# Patient Record
Sex: FEMALE | Employment: UNEMPLOYED | URBAN - NONMETROPOLITAN AREA
[De-identification: names, ages, dates, MRNs, and addresses within clinical notes are randomized per-mention and may not be internally consistent; named-entity substitution may affect disease eponyms.]

---

## 2022-09-16 RX ORDER — AMLODIPINE BESYLATE 5 MG/1
5 TABLET ORAL DAILY
COMMUNITY

## 2022-09-16 RX ORDER — ALBUTEROL SULFATE 90 UG/1
2 AEROSOL, METERED RESPIRATORY (INHALATION) EVERY 4 HOURS PRN
COMMUNITY

## 2022-09-16 RX ORDER — ATENOLOL 25 MG/1
25 TABLET ORAL DAILY
COMMUNITY

## 2022-09-16 RX ORDER — POTASSIUM CHLORIDE 750 MG/1
10 CAPSULE, EXTENDED RELEASE ORAL DAILY
COMMUNITY

## 2022-09-16 RX ORDER — WARFARIN SODIUM 5 MG/1
5 TABLET ORAL DAILY
COMMUNITY

## 2022-09-16 RX ORDER — FUROSEMIDE 40 MG/1
40 TABLET ORAL DAILY
COMMUNITY

## 2022-09-19 RX ORDER — CICLESONIDE 80 UG/1
1 AEROSOL, METERED RESPIRATORY (INHALATION) 2 TIMES DAILY
COMMUNITY

## 2022-10-18 ENCOUNTER — OFFICE VISIT (OUTPATIENT)
Dept: PULMONOLOGY | Age: 52
End: 2022-10-18
Payer: COMMERCIAL

## 2022-10-18 VITALS
TEMPERATURE: 97.9 F | WEIGHT: 250 LBS | HEART RATE: 56 BPM | BODY MASS INDEX: 42.68 KG/M2 | HEIGHT: 64 IN | SYSTOLIC BLOOD PRESSURE: 136 MMHG | DIASTOLIC BLOOD PRESSURE: 82 MMHG | OXYGEN SATURATION: 99 %

## 2022-10-18 DIAGNOSIS — R06.83 SNORING: ICD-10-CM

## 2022-10-18 DIAGNOSIS — Z95.2 S/P MVR (MITRAL VALVE REPLACEMENT): ICD-10-CM

## 2022-10-18 DIAGNOSIS — E66.01 MORBID OBESITY (HCC): ICD-10-CM

## 2022-10-18 DIAGNOSIS — R06.09 DOE (DYSPNEA ON EXERTION): Primary | ICD-10-CM

## 2022-10-18 DIAGNOSIS — U09.9 LONG COVID: ICD-10-CM

## 2022-10-18 DIAGNOSIS — R63.5 WEIGHT GAIN: ICD-10-CM

## 2022-10-18 LAB
DISTANCE WALKED: 700 FT
SPO2: 91 %

## 2022-10-18 PROCEDURE — 94618 PULMONARY STRESS TESTING: CPT | Performed by: INTERNAL MEDICINE

## 2022-10-18 PROCEDURE — 99204 OFFICE O/P NEW MOD 45 MIN: CPT | Performed by: INTERNAL MEDICINE

## 2022-10-18 RX ORDER — ESCITALOPRAM OXALATE 10 MG/1
10 TABLET ORAL DAILY
COMMUNITY

## 2022-10-18 ASSESSMENT — ENCOUNTER SYMPTOMS
ABDOMINAL PAIN: 0
APNEA: 0
BACK PAIN: 0
COUGH: 0
ANAL BLEEDING: 0
SHORTNESS OF BREATH: 1
CHEST TIGHTNESS: 0
WHEEZING: 1
ABDOMINAL DISTENTION: 0
RHINORRHEA: 0

## 2022-10-18 NOTE — PROGRESS NOTES
Pulmonary and Sleep Medicine    Amaya Wdae (:  1970) is a 49 y.o. female,New patient, here for evaluation of the following chief complaint(s):  New Patient (Referred by Jennie Maldonado- dyspnea. Worse after covid 6 months ago. )      Referring physician:  Jennie Maldonado  No address on file     ASSESSMENT/PLAN:  1. BECK (dyspnea on exertion)  -     6 Minute Walk Test  -     Full PFT Study With Bronchodilator; Future  -     CARDIAC STRESS TEST EXERCISE ONLY; Future  -     Full PFT Study With Bronchodilator; Future  2. Long COVID (2022)  3. Snoring  4. S/P MVR (mitral valve replacement)  5. Morbid obesity ([address]  6. Weight gain (about 40 lbs past two years)      Shortness of breath is multifactorial and most likely related to her cardiac history, possible underlying COPD, morbid obesity and deconditioning. During her 6-minute walk she did not desaturate significantly however her heart rate did not go above 78 bpm. She did not desaturate on RA during 6 min walk. It is possible that she is on too much beta-blockade. Would recommend PFT, cardiac stress test (the patient says she had one but results no available at this time), consider curtailing B blockade. obtain a copy of her sleep study since sleep apnea if present should be treated in her case due her cardiovascular comorbidities. Tino Mcnamara MD, [facility], [facility]    Return in about 4 weeks (around 11/15/2022). SUBJECTIVE/OBJECTIVE:  The patient is here for evaluation of shortness of breath with exertion. She developed covid in . Since then she has been feeling more shortness of breath. She has been incarcerated for two years. She gained about 40 lbs during her incarceration. She used to smoke two packs a day until her incarceration. I was asked to see her for the above. She uses rescue inhaler and she feels it helps her. She is also on maintenance inhaler. She says that she has been retaining fluid.   She endorses lower extremity edema she has been on Lasix. She had cardiology evaluation and it was cardiology's impression that her symptoms are noncardiac. She had an echocardiogram that was largely unremarkable. She does have a history of mitral valve repair and she is on chronic anticoagulation. She had a sleep study at correctional facility I do not have a copy of that. She apparently did have episodes of tachycardia with her heart rate going up to 180. She was placed on a beta-blocker. She also says that her blood pressure has been out of control for which she has been taking Norvasc to beta-blocker. No exacerbating factors other than activity. Chest x-ray done recently was reviewed. The chest x-ray was unremarkable. Prior to Visit Medications    Medication Sig Taking? Authorizing Provider   escitalopram (LEXAPRO) 10 MG tablet Take 10 mg by mouth daily Yes Historical Provider, MD   Ciclesonide (ALVESCO) 80 MCG/ACT AERS Inhale 1 puff into the lungs 2 times daily Yes Historical Provider, MD   amLODIPine (NORVASC) 5 MG tablet Take 5 mg by mouth daily Yes Historical Provider, MD   furosemide (LASIX) 40 MG tablet Take 40 mg by mouth daily Yes Historical Provider, MD   potassium chloride (MICRO-K) 10 MEQ extended release capsule Take 10 mEq by mouth daily Yes Historical Provider, MD   warfarin (COUMADIN) 5 MG tablet Take 5 mg by mouth daily Yes Historical Provider, MD   albuterol sulfate HFA (PROVENTIL;VENTOLIN;PROAIR) 108 (90 Base) MCG/ACT inhaler Inhale 2 puffs into the lungs every 4 hours as needed for Wheezing Yes Historical Provider, MD   atenolol (TENORMIN) 25 MG tablet Take 25 mg by mouth daily Yes Historical Provider, MD        Review of Systems   Constitutional:  Negative for activity change, appetite change, chills, diaphoresis and fatigue. HENT:  Negative for congestion, dental problem, drooling, ear discharge, postnasal drip and rhinorrhea. Eyes:  Negative for visual disturbance. Respiratory:  Positive for shortness of breath and wheezing. Negative for apnea, cough and chest tightness. Gastrointestinal:  Negative for abdominal distention, abdominal pain and anal bleeding. Endocrine: Negative for cold intolerance, heat intolerance and polydipsia. Genitourinary:  Negative for difficulty urinating, dysuria, enuresis and flank pain. Musculoskeletal:  Negative for arthralgias, back pain and gait problem. Allergic/Immunologic: Negative for environmental allergies. Neurological:  Negative for dizziness, facial asymmetry, light-headedness and headaches. Vitals:    10/18/22 0856   BP: 136/82   Pulse: 56   Temp: 97.9 °F (36.6 °C)   SpO2: 99%     BMI Readings from Last 1 Encounters:   10/18/22 43.59 kg/m²         Physical Exam  Vitals reviewed. Constitutional:       Appearance: Normal appearance. HENT:      Head: Normocephalic and atraumatic. Nose: Nose normal.   Eyes:      Extraocular Movements: Extraocular movements intact. Conjunctiva/sclera: Conjunctivae normal.   Cardiovascular:      Rate and Rhythm: Normal rate and regular rhythm. Heart sounds: No murmur heard. No friction rub. Pulmonary:      Effort: Pulmonary effort is normal. No respiratory distress. Breath sounds: Normal breath sounds. No stridor. No wheezing, rhonchi or rales. Abdominal:      General: There is no distension. Palpations: There is no mass. Tenderness: There is no abdominal tenderness. There is no guarding or rebound. Musculoskeletal:      Cervical back: Normal range of motion and neck supple. Neurological:      Mental Status: She is alert and oriented to person, place, and time. This note was generated used a voice recognition software. Errors in voice recognition may have occurred. An electronic signature was used to authenticate this note.     --Yana Dick MD

## 2023-01-11 ENCOUNTER — HOSPITAL ENCOUNTER (OUTPATIENT)
Dept: PULMONOLOGY | Age: 53
Discharge: HOME | End: 2023-01-11